# Patient Record
Sex: MALE | Race: WHITE | NOT HISPANIC OR LATINO | URBAN - METROPOLITAN AREA
[De-identification: names, ages, dates, MRNs, and addresses within clinical notes are randomized per-mention and may not be internally consistent; named-entity substitution may affect disease eponyms.]

---

## 2024-10-06 ENCOUNTER — EMERGENCY (EMERGENCY)
Facility: HOSPITAL | Age: 39
LOS: 1 days | Discharge: HOME | End: 2024-10-06
Attending: EMERGENCY MEDICINE
Payer: SELF-PAY

## 2024-10-06 VITALS
OXYGEN SATURATION: 97 % | SYSTOLIC BLOOD PRESSURE: 123 MMHG | DIASTOLIC BLOOD PRESSURE: 87 MMHG | WEIGHT: 156.53 LBS | RESPIRATION RATE: 17 BRPM | TEMPERATURE: 99 F | HEART RATE: 99 BPM

## 2024-10-06 LAB
ALBUMIN SERPL ELPH-MCNC: 3.2 G/DL — LOW (ref 3.5–5)
ALP SERPL-CCNC: 89 U/L — SIGNIFICANT CHANGE UP (ref 40–120)
ALT FLD-CCNC: 26 U/L DA — SIGNIFICANT CHANGE UP (ref 10–60)
ANION GAP SERPL CALC-SCNC: 4 MMOL/L — LOW (ref 5–17)
ANISOCYTOSIS BLD QL: SLIGHT — SIGNIFICANT CHANGE UP
APPEARANCE UR: CLEAR — SIGNIFICANT CHANGE UP
AST SERPL-CCNC: 18 U/L — SIGNIFICANT CHANGE UP (ref 10–40)
BASOPHILS # BLD AUTO: 0 K/UL — SIGNIFICANT CHANGE UP (ref 0–0.2)
BASOPHILS NFR BLD AUTO: 0 % — SIGNIFICANT CHANGE UP (ref 0–2)
BILIRUB SERPL-MCNC: 0.2 MG/DL — SIGNIFICANT CHANGE UP (ref 0.2–1.2)
BILIRUB UR-MCNC: NEGATIVE — SIGNIFICANT CHANGE UP
BUN SERPL-MCNC: 14 MG/DL — SIGNIFICANT CHANGE UP (ref 7–18)
CALCIUM SERPL-MCNC: 9.1 MG/DL — SIGNIFICANT CHANGE UP (ref 8.4–10.5)
CHLORIDE SERPL-SCNC: 109 MMOL/L — HIGH (ref 96–108)
CO2 SERPL-SCNC: 24 MMOL/L — SIGNIFICANT CHANGE UP (ref 22–31)
COLOR SPEC: YELLOW — SIGNIFICANT CHANGE UP
CREAT SERPL-MCNC: 1.11 MG/DL — SIGNIFICANT CHANGE UP (ref 0.5–1.3)
DIFF PNL FLD: NEGATIVE — SIGNIFICANT CHANGE UP
EGFR: 87 ML/MIN/1.73M2 — SIGNIFICANT CHANGE UP
EOSINOPHIL # BLD AUTO: 0 K/UL — SIGNIFICANT CHANGE UP (ref 0–0.5)
EOSINOPHIL NFR BLD AUTO: 0 % — SIGNIFICANT CHANGE UP (ref 0–6)
GLUCOSE SERPL-MCNC: 91 MG/DL — SIGNIFICANT CHANGE UP (ref 70–99)
GLUCOSE UR QL: NEGATIVE MG/DL — SIGNIFICANT CHANGE UP
HCT VFR BLD CALC: 39.6 % — SIGNIFICANT CHANGE UP (ref 39–50)
HGB BLD-MCNC: 13.2 G/DL — SIGNIFICANT CHANGE UP (ref 13–17)
KETONES UR-MCNC: NEGATIVE MG/DL — SIGNIFICANT CHANGE UP
LEUKOCYTE ESTERASE UR-ACNC: NEGATIVE — SIGNIFICANT CHANGE UP
LYMPHOCYTES # BLD AUTO: 1.57 K/UL — SIGNIFICANT CHANGE UP (ref 1–3.3)
LYMPHOCYTES # BLD AUTO: 29 % — SIGNIFICANT CHANGE UP (ref 13–44)
MANUAL SMEAR VERIFICATION: SIGNIFICANT CHANGE UP
MCHC RBC-ENTMCNC: 28.9 PG — SIGNIFICANT CHANGE UP (ref 27–34)
MCHC RBC-ENTMCNC: 33.3 GM/DL — SIGNIFICANT CHANGE UP (ref 32–36)
MCV RBC AUTO: 86.7 FL — SIGNIFICANT CHANGE UP (ref 80–100)
MICROCYTES BLD QL: SLIGHT — SIGNIFICANT CHANGE UP
MONOCYTES # BLD AUTO: 0.43 K/UL — SIGNIFICANT CHANGE UP (ref 0–0.9)
MONOCYTES NFR BLD AUTO: 8 % — SIGNIFICANT CHANGE UP (ref 2–14)
NEUTROPHILS # BLD AUTO: 3.42 K/UL — SIGNIFICANT CHANGE UP (ref 1.8–7.4)
NEUTROPHILS NFR BLD AUTO: 62 % — SIGNIFICANT CHANGE UP (ref 43–77)
NEUTS BAND # BLD: 1 % — SIGNIFICANT CHANGE UP (ref 0–8)
NITRITE UR-MCNC: NEGATIVE — SIGNIFICANT CHANGE UP
NRBC # BLD: 0 /100 WBCS — SIGNIFICANT CHANGE UP (ref 0–0)
PH UR: 8 — SIGNIFICANT CHANGE UP (ref 5–8)
PLAT MORPH BLD: NORMAL — SIGNIFICANT CHANGE UP
PLATELET # BLD AUTO: 117 K/UL — LOW (ref 150–400)
PLATELET CLUMP BLD QL SMEAR: ABNORMAL
POTASSIUM SERPL-MCNC: 4.5 MMOL/L — SIGNIFICANT CHANGE UP (ref 3.5–5.3)
POTASSIUM SERPL-SCNC: 4.5 MMOL/L — SIGNIFICANT CHANGE UP (ref 3.5–5.3)
PROT SERPL-MCNC: 6.9 G/DL — SIGNIFICANT CHANGE UP (ref 6–8.3)
PROT UR-MCNC: NEGATIVE MG/DL — SIGNIFICANT CHANGE UP
RBC # BLD: 4.57 M/UL — SIGNIFICANT CHANGE UP (ref 4.2–5.8)
RBC # FLD: 13.1 % — SIGNIFICANT CHANGE UP (ref 10.3–14.5)
RBC BLD AUTO: ABNORMAL
SODIUM SERPL-SCNC: 137 MMOL/L — SIGNIFICANT CHANGE UP (ref 135–145)
SP GR SPEC: 1.01 — SIGNIFICANT CHANGE UP (ref 1–1.03)
UROBILINOGEN FLD QL: 0.2 MG/DL — SIGNIFICANT CHANGE UP (ref 0.2–1)
WBC # BLD: 5.43 K/UL — SIGNIFICANT CHANGE UP (ref 3.8–10.5)
WBC # FLD AUTO: 5.43 K/UL — SIGNIFICANT CHANGE UP (ref 3.8–10.5)

## 2024-10-06 PROCEDURE — 80074 ACUTE HEPATITIS PANEL: CPT

## 2024-10-06 PROCEDURE — 36415 COLL VENOUS BLD VENIPUNCTURE: CPT

## 2024-10-06 PROCEDURE — 86592 SYPHILIS TEST NON-TREP QUAL: CPT

## 2024-10-06 PROCEDURE — 86593 SYPHILIS TEST NON-TREP QUANT: CPT

## 2024-10-06 PROCEDURE — 87491 CHLMYD TRACH DNA AMP PROBE: CPT

## 2024-10-06 PROCEDURE — 86355 B CELLS TOTAL COUNT: CPT

## 2024-10-06 PROCEDURE — 96372 THER/PROPH/DIAG INJ SC/IM: CPT

## 2024-10-06 PROCEDURE — 86359 T CELLS TOTAL COUNT: CPT

## 2024-10-06 PROCEDURE — 86357 NK CELLS TOTAL COUNT: CPT

## 2024-10-06 PROCEDURE — 99284 EMERGENCY DEPT VISIT MOD MDM: CPT

## 2024-10-06 PROCEDURE — 87591 N.GONORRHOEAE DNA AMP PROB: CPT

## 2024-10-06 PROCEDURE — 81003 URINALYSIS AUTO W/O SCOPE: CPT

## 2024-10-06 PROCEDURE — 80053 COMPREHEN METABOLIC PANEL: CPT

## 2024-10-06 PROCEDURE — 99283 EMERGENCY DEPT VISIT LOW MDM: CPT | Mod: 25

## 2024-10-06 PROCEDURE — 85025 COMPLETE CBC W/AUTO DIFF WBC: CPT

## 2024-10-06 PROCEDURE — 86360 T CELL ABSOLUTE COUNT/RATIO: CPT

## 2024-10-06 PROCEDURE — 86780 TREPONEMA PALLIDUM: CPT

## 2024-10-06 RX ORDER — CEFTRIAXONE SODIUM 1 G
500 VIAL (EA) INJECTION ONCE
Refills: 0 | Status: COMPLETED | OUTPATIENT
Start: 2024-10-06 | End: 2024-10-06

## 2024-10-06 RX ORDER — PENICILLIN G BENZATHINE 1200000 [IU]/2ML
2.4 INJECTION, SUSPENSION INTRAMUSCULAR ONCE
Refills: 0 | Status: COMPLETED | OUTPATIENT
Start: 2024-10-06 | End: 2024-10-06

## 2024-10-06 RX ORDER — DOXYCYCLINE HYCLATE 100 MG
1 CAPSULE ORAL
Qty: 14 | Refills: 0
Start: 2024-10-06 | End: 2024-10-12

## 2024-10-06 RX ORDER — DOXYCYCLINE HYCLATE 100 MG
100 CAPSULE ORAL ONCE
Refills: 0 | Status: COMPLETED | OUTPATIENT
Start: 2024-10-06 | End: 2024-10-06

## 2024-10-06 RX ADMIN — Medication 500 MILLIGRAM(S): at 19:19

## 2024-10-06 RX ADMIN — Medication 100 MILLIGRAM(S): at 19:19

## 2024-10-06 RX ADMIN — PENICILLIN G BENZATHINE 2.4 MILLION UNIT(S): 1200000 INJECTION, SUSPENSION INTRAMUSCULAR at 19:20

## 2024-10-06 NOTE — ED PROVIDER NOTE - NSFOLLOWUPINSTRUCTIONS_ED_ALL_ED_FT
Follow up with your primary care doctor within 1 week.     Prescription for an antibiotic was given to you in the emergency room.  Make sure you fill this medication and take it as directed and until all the medication is completed.    You have been given a prescription for the antibiotic Doxycycline. This medication may make your skin MORE SENSITIVE TO GET SUNBURN. Common side effects may be: nausea, vomiting, abdominal pain. Take the medication with food and full glass of water to prevent irritation of you esophagus. Taking the medication with food containing iron or calcium may decrease its absorption.     You can take Motrin (ibuprofen) 600 mg every 6 hours or Tylenol (acetaminophen) 1000 mg every 6 hours as needed for pain or fever.     If you experience any new or worsening symptoms or if you are concerned you can always come back to the emergency for a re-evaluation.

## 2024-10-06 NOTE — ED ADULT NURSE NOTE - SUICIDE SCREENING DEPRESSION
Negative Bexarotene Pregnancy And Lactation Text: This medication is Pregnancy Category X and should not be given to women who are pregnant or may become pregnant. This medication should not be used if you are breast feeding.

## 2024-10-06 NOTE — ED PROVIDER NOTE - PHYSICAL EXAMINATION
erythema and folliculitis to upper lip likely secondary to ingrown hair    palpable anterior cervical lymph nodes

## 2024-10-06 NOTE — ED PROVIDER NOTE - CLINICAL SUMMARY MEDICAL DECISION MAKING FREE TEXT BOX
39-year-old male with a past medical history of HIV presents with reports that he had intercourse 2 weeks ago with someone who tested positive for gonorrhea, chlamydia, syphilis.  Patient reports he would like to be treated and tested.  Patient states his last CD4 count was normal 2 months ago but he is inconsistent with his medications.  Patient states he just moved to [place] and has no follow-up care.    Will obtain labs and treat for STD exposure.  Will have patient follow-up with PCP.

## 2024-10-06 NOTE — ED PROVIDER NOTE - ATTENDING APP SHARED VISIT CONTRIBUTION OF CARE
HIV without meds at this time  intercourse with a partner concerned has STDs  rash to face, erythematous, not hot to touch, not cellulitic appearing, atypical, needs derm fu  kel brink and syph sent, wont result during ED visit  nontoxic in appearance  provided resources to reestablish HIV care locally as he is new to the area

## 2024-10-06 NOTE — ED PROVIDER NOTE - OBJECTIVE STATEMENT
39-year-old male with a past medical history of HIV presents with reports that he had intercourse 2 weeks ago with someone who tested positive for gonorrhea, chlamydia, syphilis.  Patient reports he would like to be treated and tested.  Patient states his last CD4 count was normal 2 months ago but he is inconsistent with his medications.  Patient states he just moved to [place] and has no follow-up care.

## 2024-10-06 NOTE — ED PROVIDER NOTE - WORK/EXCUSE FORM DATE
"Chief Complaint   Patient presents with     Cough       Initial /76  Pulse 84  Temp 98.3  F (36.8  C) (Tympanic)  Ht 5' 8\" (1.727 m)  Wt 281 lb (127.5 kg)  SpO2 99%  BMI 42.73 kg/m2 Estimated body mass index is 42.73 kg/(m^2) as calculated from the following:    Height as of this encounter: 5' 8\" (1.727 m).    Weight as of this encounter: 281 lb (127.5 kg).  Medication Reconciliation: complete     Jong Jackson CMA    "
09-Oct-2024

## 2024-10-06 NOTE — ED ADULT NURSE NOTE - OBJECTIVE STATEMENT
AOX4 +ambulatory patient reports hx of HIV on meds reports exposure to STD unprotected sex. No penile drainage

## 2024-10-06 NOTE — ED PROVIDER NOTE - PATIENT PORTAL LINK FT
You can access the FollowMyHealth Patient Portal offered by [facility] by registering at the following website: [email or web address]. By joining [facility]’s FollowMyHealth portal, you will also be able to view your health information using other applications (apps) compatible with our system.

## 2024-10-07 LAB
4/8 RATIO: 0.29 RATIO — LOW (ref 0.9–3.6)
ABS CD8: 1506 CELLS/UL — HIGH (ref 142–740)
C TRACH RRNA SPEC QL NAA+PROBE: SIGNIFICANT CHANGE UP
CD16+CD56+ CELLS NFR BLD: 4 % — LOW (ref 5–23)
CD16+CD56+ CELLS NFR SPEC: 86 CELLS/UL — SIGNIFICANT CHANGE UP (ref 71–410)
CD19 BLASTS SPEC-ACNC: 154 CELLS/UL — SIGNIFICANT CHANGE UP (ref 84–469)
CD19 BLASTS SPEC-ACNC: 7 % — SIGNIFICANT CHANGE UP (ref 6–24)
CD3 BLASTS SPEC-ACNC: 2020 CELLS/UL — HIGH (ref 672–1870)
CD3 BLASTS SPEC-ACNC: 87 % — HIGH (ref 59–83)
CD4 %: 19 % — LOW (ref 30–62)
CD8 %: 64 % — HIGH (ref 12–36)
HAV IGM SER-ACNC: SIGNIFICANT CHANGE UP
HBV CORE IGM SER-ACNC: SIGNIFICANT CHANGE UP
HBV SURFACE AG SER-ACNC: SIGNIFICANT CHANGE UP
HCV AB S/CO SERPL IA: 0.26 S/CO — SIGNIFICANT CHANGE UP (ref 0–0.99)
HCV AB SERPL-IMP: SIGNIFICANT CHANGE UP
N GONORRHOEA RRNA SPEC QL NAA+PROBE: SIGNIFICANT CHANGE UP
RPR SER-TITR: (no result)
RPR SERPL-ACNC: REACTIVE
SPECIMEN SOURCE: SIGNIFICANT CHANGE UP
T PALLIDUM AB TITR SER: POSITIVE
T-CELL CD4 SUBSET PNL BLD: 440 CELLS/UL — LOW (ref 489–1457)